# Patient Record
Sex: MALE | Race: WHITE
[De-identification: names, ages, dates, MRNs, and addresses within clinical notes are randomized per-mention and may not be internally consistent; named-entity substitution may affect disease eponyms.]

---

## 2020-07-07 ENCOUNTER — HOSPITAL ENCOUNTER (INPATIENT)
Dept: HOSPITAL 84 - D.ER | Age: 60
LOS: 2 days | Discharge: HOME | DRG: 511 | End: 2020-07-09
Attending: FAMILY MEDICINE | Admitting: FAMILY MEDICINE
Payer: COMMERCIAL

## 2020-07-07 VITALS
WEIGHT: 140.29 LBS | BODY MASS INDEX: 23.95 KG/M2 | BODY MASS INDEX: 23.95 KG/M2 | WEIGHT: 140.29 LBS | HEIGHT: 64 IN | HEIGHT: 64 IN

## 2020-07-07 VITALS — SYSTOLIC BLOOD PRESSURE: 139 MMHG | DIASTOLIC BLOOD PRESSURE: 86 MMHG

## 2020-07-07 VITALS — SYSTOLIC BLOOD PRESSURE: 123 MMHG | DIASTOLIC BLOOD PRESSURE: 70 MMHG

## 2020-07-07 VITALS — SYSTOLIC BLOOD PRESSURE: 125 MMHG | DIASTOLIC BLOOD PRESSURE: 80 MMHG

## 2020-07-07 DIAGNOSIS — S52.501A: Primary | ICD-10-CM

## 2020-07-07 DIAGNOSIS — S63.257A: ICD-10-CM

## 2020-07-07 DIAGNOSIS — S32.512A: ICD-10-CM

## 2020-07-07 DIAGNOSIS — S00.03XA: ICD-10-CM
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
## 2020-07-07 LAB
ALBUMIN SERPL-MCNC: 3.7 G/DL (ref 3.4–5)
ALP SERPL-CCNC: 70 U/L (ref 30–120)
ALT SERPL-CCNC: 56 U/L (ref 10–68)
ANION GAP SERPL CALC-SCNC: 11.8 MMOL/L (ref 8–16)
APTT BLD: 28.1 SECONDS (ref 22.8–39.4)
BASOPHILS NFR BLD AUTO: 1.2 % (ref 0–2)
BILIRUB SERPL-MCNC: 0.44 MG/DL (ref 0.2–1.3)
BILIRUB SERPL-MCNC: NEGATIVE MG/DL
BUN SERPL-MCNC: 31 MG/DL (ref 7–18)
CALCIUM SERPL-MCNC: 8.9 MG/DL (ref 8.5–10.1)
CHLORIDE SERPL-SCNC: 104 MMOL/L (ref 98–107)
CO2 SERPL-SCNC: 26 MMOL/L (ref 21–32)
CREAT SERPL-MCNC: 1.2 MG/DL (ref 0.6–1.3)
EOSINOPHIL NFR BLD: 1.3 % (ref 0–7)
ERYTHROCYTE [DISTWIDTH] IN BLOOD BY AUTOMATED COUNT: 12.9 % (ref 11.5–14.5)
GLOBULIN SER-MCNC: 3.3 G/L
GLUCOSE SERPL-MCNC: 100 MG/DL (ref 74–106)
GLUCOSE SERPL-MCNC: NEGATIVE MG/DL
HCT VFR BLD CALC: 41.9 % (ref 42–54)
HGB BLD-MCNC: 14 G/DL (ref 13.5–17.5)
IMM GRANULOCYTES NFR BLD: 0.9 % (ref 0–5)
INR PPP: 0.97 (ref 0.85–1.17)
KETONES UR STRIP-MCNC: NEGATIVE MG/DL
LYMPHOCYTES NFR BLD AUTO: 12.1 % (ref 15–50)
MCH RBC QN AUTO: 31.7 PG (ref 26–34)
MCHC RBC AUTO-ENTMCNC: 33.4 G/DL (ref 31–37)
MCV RBC: 94.8 FL (ref 80–100)
MONOCYTES NFR BLD: 6.7 % (ref 2–11)
NEUTROPHILS NFR BLD AUTO: 77.8 % (ref 40–80)
NITRITE UR-MCNC: NEGATIVE MG/ML
OSMOLALITY SERPL CALC.SUM OF ELEC: 282 MOSM/KG (ref 275–300)
PH UR STRIP: 5 [PH] (ref 5–6)
PLATELET # BLD: 216 10X3/UL (ref 130–400)
PMV BLD AUTO: 9.6 FL (ref 7.4–10.4)
POTASSIUM SERPL-SCNC: 3.8 MMOL/L (ref 3.5–5.1)
PROT SERPL-MCNC: 7 G/DL (ref 6.4–8.2)
PROTHROMBIN TIME: 12.8 SECONDS (ref 11.6–15)
RBC # BLD AUTO: 4.42 10X6/UL (ref 4.2–6.1)
RBC #/AREA URNS HPF: (no result) /HPF (ref 0–5)
SODIUM SERPL-SCNC: 138 MMOL/L (ref 136–145)
SP GR UR STRIP: 1.02 (ref 1–1.02)
UROBILINOGEN UR-MCNC: NORMAL MG/DL
WBC # BLD AUTO: 10 10X3/UL (ref 4.8–10.8)
WBC #/AREA URNS HPF: (no result) /HPF

## 2020-07-07 PROCEDURE — 0RSXXZZ REPOSITION LEFT FINGER PHALANGEAL JOINT, EXTERNAL APPROACH: ICD-10-PCS | Performed by: FAMILY MEDICINE

## 2020-07-07 PROCEDURE — 0PSH04Z REPOSITION RIGHT RADIUS WITH INTERNAL FIXATION DEVICE, OPEN APPROACH: ICD-10-PCS | Performed by: ORTHOPAEDIC SURGERY

## 2020-07-07 NOTE — NUR
RECIEVED TO ROOM ALERT RIGHT ARM IN SPLINT, LACERATION NOTED TO LEFT CHEEK, C
COLLAR IN PLACE, HAS LACERATIONS NOTED TO LEFT FOREARM AND RED ESCORATED AREAS
NOTED TO LEFT HIP AND SIDE, BRUSING ALSO NOTED TO FORHEAD, WIFE AT BEDSIDE
BOTH SPEAK VERY LITTLE ENGLISH, HS NOTIFIED FOR  PHONE, ABLE TO
ORIENTIATE TO ROOM AND USE CALL LIGHT

## 2020-07-07 NOTE — NUR
PHONE CONNECTED AND DISCUSSED PLAN OF CARE AND PLAN FOR SURGERY IN
AM CONCENTS REVIEWED AND SIGNED AFTER BOTH PT AND WIFE VERBALIZED
UNDERSTANDING, QUESTIONS ANSWERED VIA

## 2020-07-08 VITALS — SYSTOLIC BLOOD PRESSURE: 126 MMHG | DIASTOLIC BLOOD PRESSURE: 74 MMHG

## 2020-07-08 VITALS — SYSTOLIC BLOOD PRESSURE: 95 MMHG | DIASTOLIC BLOOD PRESSURE: 56 MMHG

## 2020-07-08 VITALS — SYSTOLIC BLOOD PRESSURE: 94 MMHG | DIASTOLIC BLOOD PRESSURE: 55 MMHG

## 2020-07-08 VITALS — SYSTOLIC BLOOD PRESSURE: 123 MMHG | DIASTOLIC BLOOD PRESSURE: 70 MMHG

## 2020-07-08 VITALS — DIASTOLIC BLOOD PRESSURE: 65 MMHG | SYSTOLIC BLOOD PRESSURE: 118 MMHG

## 2020-07-08 VITALS — DIASTOLIC BLOOD PRESSURE: 61 MMHG | SYSTOLIC BLOOD PRESSURE: 101 MMHG

## 2020-07-08 VITALS — DIASTOLIC BLOOD PRESSURE: 49 MMHG | SYSTOLIC BLOOD PRESSURE: 98 MMHG

## 2020-07-08 VITALS — DIASTOLIC BLOOD PRESSURE: 65 MMHG | SYSTOLIC BLOOD PRESSURE: 116 MMHG

## 2020-07-08 VITALS — SYSTOLIC BLOOD PRESSURE: 106 MMHG | DIASTOLIC BLOOD PRESSURE: 64 MMHG

## 2020-07-08 LAB
ALBUMIN SERPL-MCNC: 3.3 G/DL (ref 3.4–5)
ALP SERPL-CCNC: 60 U/L (ref 30–120)
ALT SERPL-CCNC: 46 U/L (ref 10–68)
ANION GAP SERPL CALC-SCNC: 12.8 MMOL/L (ref 8–16)
BASOPHILS NFR BLD AUTO: 0.5 % (ref 0–2)
BILIRUB SERPL-MCNC: 0.8 MG/DL (ref 0.2–1.3)
BUN SERPL-MCNC: 25 MG/DL (ref 7–18)
CALCIUM SERPL-MCNC: 8.2 MG/DL (ref 8.5–10.1)
CHLORIDE SERPL-SCNC: 108 MMOL/L (ref 98–107)
CK MB SERPL-MCNC: 11.4 U/L (ref 0–3.6)
CK MB SERPL-MCNC: 14 U/L (ref 0–3.6)
CK SERPL-CCNC: 620 UL (ref 21–232)
CK SERPL-CCNC: 663 UL (ref 21–232)
CO2 SERPL-SCNC: 23.5 MMOL/L (ref 21–32)
CREAT SERPL-MCNC: 0.9 MG/DL (ref 0.6–1.3)
EOSINOPHIL NFR BLD: 0.1 % (ref 0–7)
ERYTHROCYTE [DISTWIDTH] IN BLOOD BY AUTOMATED COUNT: 13.1 % (ref 11.5–14.5)
GLOBULIN SER-MCNC: 2.7 G/L
GLUCOSE SERPL-MCNC: 105 MG/DL (ref 74–106)
HCT VFR BLD CALC: 39.6 % (ref 42–54)
HGB BLD-MCNC: 13.1 G/DL (ref 13.5–17.5)
IMM GRANULOCYTES NFR BLD: 0.3 % (ref 0–5)
LYMPHOCYTES NFR BLD AUTO: 18.6 % (ref 15–50)
MAGNESIUM SERPL-MCNC: 2 MG/DL (ref 1.8–2.4)
MAGNESIUM SERPL-MCNC: 2 MG/DL (ref 1.8–2.4)
MCH RBC QN AUTO: 31.3 PG (ref 26–34)
MCHC RBC AUTO-ENTMCNC: 33.1 G/DL (ref 31–37)
MCV RBC: 94.7 FL (ref 80–100)
MONOCYTES NFR BLD: 7.4 % (ref 2–11)
NEUTROPHILS NFR BLD AUTO: 73.1 % (ref 40–80)
OSMOLALITY SERPL CALC.SUM OF ELEC: 282 MOSM/KG (ref 275–300)
PLATELET # BLD: 217 10X3/UL (ref 130–400)
PMV BLD AUTO: 10.1 FL (ref 7.4–10.4)
POTASSIUM SERPL-SCNC: 4.3 MMOL/L (ref 3.5–5.1)
PROT SERPL-MCNC: 6 G/DL (ref 6.4–8.2)
RBC # BLD AUTO: 4.18 10X6/UL (ref 4.2–6.1)
SODIUM SERPL-SCNC: 140 MMOL/L (ref 136–145)
TROPONIN I SERPL-MCNC: 0.02 NG/ML (ref 0–0.06)
TROPONIN I SERPL-MCNC: 0.03 NG/ML (ref 0–0.06)
WBC # BLD AUTO: 10 10X3/UL (ref 4.8–10.8)

## 2020-07-08 NOTE — NUR
PATIENT IN BED WITH IV INTACT. NO COMPLAINTS OR SIGNS OF DISTRESS. FAMILY AT
BEDSIDE. VS STABLE. CALL LIGHT WITHIN REACH.

## 2020-07-08 NOTE — NUR
ALERT RESTING IN BED DENIES PAIN OR NEEDS AT THSI TIME ACE WRAP DRESSING
INTACT TO RIGHT ARM, SEE SHIFT ASSESSMENT, CALL LIGHT IN REACH

## 2020-07-08 NOTE — NUR
PATIENT IN BED WITH IV INTACT. VS MONITOR UNPLUGGED. UNABLE TO RECORD POST OP
VS. VS HAVE BEEN STABLE THROUGH OUT DAY. PATIENT STILL HAS NO COMPLAINTS OF
PAIN. IV INTACT. CALL LIGHT WITHIN REACH.

## 2020-07-08 NOTE — NUR
PATIENT BACK TO ROOM. VS STABLE IV INTACT. STATES NO PAIN AT THIS TIME. FAMILY
AT BEDSIDE. ELEVATED ARM ON PILLOW AND PLACED ICE PACK. BSCDS ON AND WORKING.
CALL LIGHT WITHIN REACH.

## 2020-07-09 VITALS — DIASTOLIC BLOOD PRESSURE: 75 MMHG | SYSTOLIC BLOOD PRESSURE: 115 MMHG

## 2020-07-09 VITALS — SYSTOLIC BLOOD PRESSURE: 110 MMHG | DIASTOLIC BLOOD PRESSURE: 70 MMHG

## 2020-07-09 VITALS — SYSTOLIC BLOOD PRESSURE: 110 MMHG | DIASTOLIC BLOOD PRESSURE: 64 MMHG

## 2020-07-09 LAB
ALBUMIN SERPL-MCNC: 3.1 G/DL (ref 3.4–5)
ALP SERPL-CCNC: 55 U/L (ref 30–120)
ALT SERPL-CCNC: 38 U/L (ref 10–68)
ANION GAP SERPL CALC-SCNC: 9.5 MMOL/L (ref 8–16)
BASOPHILS NFR BLD AUTO: 0.2 % (ref 0–2)
BILIRUB SERPL-MCNC: 1.05 MG/DL (ref 0.2–1.3)
BUN SERPL-MCNC: 21 MG/DL (ref 7–18)
CALCIUM SERPL-MCNC: 8.1 MG/DL (ref 8.5–10.1)
CHLORIDE SERPL-SCNC: 106 MMOL/L (ref 98–107)
CO2 SERPL-SCNC: 25.5 MMOL/L (ref 21–32)
CREAT SERPL-MCNC: 0.9 MG/DL (ref 0.6–1.3)
EOSINOPHIL NFR BLD: 0.1 % (ref 0–7)
ERYTHROCYTE [DISTWIDTH] IN BLOOD BY AUTOMATED COUNT: 13.1 % (ref 11.5–14.5)
GLOBULIN SER-MCNC: 2.9 G/L
GLUCOSE SERPL-MCNC: 110 MG/DL (ref 74–106)
HCT VFR BLD CALC: 36.9 % (ref 42–54)
HGB BLD-MCNC: 12.3 G/DL (ref 13.5–17.5)
IMM GRANULOCYTES NFR BLD: 0.1 % (ref 0–5)
LYMPHOCYTES NFR BLD AUTO: 13.5 % (ref 15–50)
MAGNESIUM SERPL-MCNC: 2.2 MG/DL (ref 1.8–2.4)
MCH RBC QN AUTO: 31.5 PG (ref 26–34)
MCHC RBC AUTO-ENTMCNC: 33.3 G/DL (ref 31–37)
MCV RBC: 94.6 FL (ref 80–100)
MONOCYTES NFR BLD: 10.8 % (ref 2–11)
NEUTROPHILS NFR BLD AUTO: 75.3 % (ref 40–80)
OSMOLALITY SERPL CALC.SUM OF ELEC: 277 MOSM/KG (ref 275–300)
PLATELET # BLD: 208 10X3/UL (ref 130–400)
PMV BLD AUTO: 9.9 FL (ref 7.4–10.4)
POTASSIUM SERPL-SCNC: 4 MMOL/L (ref 3.5–5.1)
PROT SERPL-MCNC: 6 G/DL (ref 6.4–8.2)
RBC # BLD AUTO: 3.9 10X6/UL (ref 4.2–6.1)
SODIUM SERPL-SCNC: 137 MMOL/L (ref 136–145)
WBC # BLD AUTO: 10.8 10X3/UL (ref 4.8–10.8)

## 2020-07-09 NOTE — MORECARE
CASE MANAGEMENT DISCHARGE SUMMARY
 
 
PATIENT: RODRIGUEZ,REYES                     UNIT: [id number]
ACCOUNT#: [id number]                       ADM DATE: 20
AGE: 60     : 64  SEX: M            ROOM/BED: D.1212    
AUTHOR: CARONDOC                             PHYSICIAN:                               
 
REFERRING PHYSICIAN: ANN-MARIE WONG MD           
DATE OF SERVICE: 20
Discharge Plan
 
 
Patient Name: RODRIGUEZ, REYES
Facility: [facility]:[place]
Encounter #: [id number]
Medical Record #: [id number]
: 1960
Planned Disposition: Home or Self Care
Anticipated Discharge Date: 
 
Discharge Date: 
Expected LOS: 
Initial Reviewer: [email or web address]
Initial Review Date: 2020
Generated: 20   8:43 am 
Comments
 
DCP- Discharge Planning
 
Updated by [id number]: Sendy Grimaldo on 20   6:42 am CT
Patient Name: REYES RODRIGUEZ                                     
Admission Status: ER   
Accout number: [id number]                              
Admission Date: 2020   
: 1960                                                        
Admission Diagnosis:   
Attending: JAIME                                                
Current LOS:  2   
  
Anticipated DC Date:    
Planned Disposition: Home or Self Care   
Primary Insurance: [employer] MANAGED MEDICA[employer]   
  
  
Discharge Planning Comments:   
CM met with patient to complete initial dc planning assessment.  CM educated 
patient on the CM role and verbal consent given by patient to complete 
assessment.   Patient lives at home with his wife where he is independent 
with his care.  At discharge patient plans to return home and feels this is a 
 
safe discharge.  CM discussed availability of home health, rehab services, 
and medical equipment. The wife did not think he needed anything and would 
like to go home today. Patient denied known discharge needs at this time. CM 
will continue to follow and will assist as needed with dc plans/needs.    
  
  
  
  
  
: Sendy Grimaldo
 DCPIA - Discharge Planning Initial Assessment
 
Updated by [id number]: Sendy Grimaldo on 20   7:34 am
*  Is the patient Alert and Oriented?
Yes
*  How many steps to enter\exit or inside your home?
 
*  PCP
none
*  Pharmacy
[facility]
*  Preadmission Environment
Home with Family
*  ADLs
Independent
*  Equipment
None
*  List name and contact numbers for known caregivers / representatives who 
currently or will assist patient after discharge:
ELISSA (WIFE) [phone]
*  Verbal permission to speak to the caregivers and representatives has been 
obtained from the patient.
N/A
*  Community resources currently utilized
None
*  Additional services required to return to the preadmission environment?
No
*  Can the patient safely return to the preadmission environment?
Yes
*  Has this patient been hospitalized within the prior 30 days at any 
hospital?
No
 
 
 
 
 
 
 
Last DP export: 20   6:37 am
Patient Name: RODRIGUEZ, REYES
 
Encounter #: [id number]
Page [phone]
 
 
 
 
 
Electronically Signed by BRETT REARDON on 20 at 0744
 
 
 
 
 
 
**All edits/amendments must be made on the electronic document**
 
DICTATION DATE: 20     : YARY  2044     
RPT#: [id number]                                DC DATE:        
                                               STATUS: ADM IN  
[facility]
 [place] [address]
***END OF REPORT***

## 2020-07-09 NOTE — NUR
SPOKE WITH CHARLENE ABOUT PATIENT WANTING TO BE DC'D. DOESNT WANT TO USE
PLATFORM WALKER. STATED HE HAS NO PAIN WHEN GETTING UP WITH PT. CHARLENE
STATED SHE WOULD PUT IN THE ORDERS.

## 2020-07-09 NOTE — NUR
PATIENT IN BED WITH IV INTACT. NO COMPLAINTS OR SIGNS OF DISTRESS. FAMILY AT
BEDSIDE. CALL LIGHT WITHIN REACH. WRIST WITH DRESSING CDI, ELEVATED ON PILLOW.
NEUROVASCULAR CHECKS WNL.

## 2020-07-09 NOTE — OP
PATIENT NAME:  RODRIGUEZ,REYES                           MEDICAL RECORD: [id number]
:[birth year]                                             LOCATION:D.M3     D.1212
                                                         ADMISSION DATE:20
SURGEON:  BENOIT JARVIS MD        
 
 
DATE OF OPERATION:  2020
 
PREOPERATIVE DIAGNOSIS:  Displaced right distal radius fracture.
 
POSTOPERATIVE DIAGNOSIS:  Displaced right distal radius fracture.
 
PROCEDURE:  Open reduction and internal fixation of displaced right distal
radius fracture.
 
SURGEON:  Benoit Jarvis MD
 
ANESTHESIA:  General with a preoperative interscalene block.
 
INTRAOPERATIVE COMPLICATIONS:  None.
 
SUMMARY OF PATHOLOGIC FINDINGS:  The patient had a very displaced fracture that
reduced nicely with closed reduction and then was plated as it was unstable
pattern.
 
IMPLANTS USED:  Radom VariAx 2 system with a combination of both compression
and locking screws.
 
OPERATIVE SUMMARY IN DETAIL:  After obtaining the appropriate preoperative
orthopedic surgery consent as well as anesthetic consultation, evaluation, and
clearance, the patient was brought to the operating room and placed on the
operating table in the supine position.  After adequate general laryngeal mask
airway was administered, tourniquet was placed about the proximal aspect of the
right upper extremity.  The right upper extremity was then prepped and draped in
a routine sterile fashion.  The arm was elevated, exsanguinated, and the
tourniquet was inflated to 250 mmHg.  A routine volar approach of Lv's was
utilized, taken down to the level of the FCR, which was utilized as landmark. 
The dissection was then carried down to the fracture itself, gently clearing the
muscle belly.  Fracture was identified and it was reduced as seen under
fluoroscopy on both AP and lateral planes.  Plate was applied under fluoroscopic
guidance.  A combination of both locking and nonlocking screws was utilized for
fixation of the fracture.  After the fracture had been fixed in anatomical
plane, both AP and lateral views were taken and submitted to radiologist for
review.  The wound was then irrigated and closed with 2-0 Vicryl followed by 4-0
Prolene in a running fashion.  Sterile dressings were applied.  Tourniquet was
deflated.  The patient was awakened and taken to recovery room in stable
condition.  All final needle and sponge counts were correct.
 
NTS:[id number] Voice Confirmation ID: [id number] DOCUMENT ID: [id number]
 
 
 
OPERATIVE REPORT                               [id number]    RODRIGUEZ,REYES RUDDER MD, BENOIT WILLIS        
 
 
 
Electronically Signed by BENOIT JARVIS MD on 20 at 1039
 
 
 
 
 
 
 
 
 
 
 
 
 
 
 
 
 
 
 
 
 
 
 
 
 
 
 
 
 
 
 
 
 
 
 
 
 
 
 
 
 
CC:                                                             [id number]
DICTATION DATE: 20 0955     :     20 1849      ADM IN  
                                                                              
[facility]                                          
[address] [id number]

## 2020-07-09 NOTE — NUR
PATIENT IN BED WITH IV NTACT. NO COMPLAINTS OR SIGNS OF DISTRESS. FAMILY AT
BEDSIDE. CALL LIGHT WITHIN REACH.

## 2020-07-09 NOTE — MORECARE
CASE MANAGEMENT DISCHARGE SUMMARY
 
 
PATIENT: RODRIGUEZ,REYES                     UNIT: [id number]
ACCOUNT#: [id number]                       ADM DATE: 20
AGE: 60     : 64  SEX: M            ROOM/BED: D.1212    
AUTHOR: BRETT REARDON                             PHYSICIAN:                               
 
REFERRING PHYSICIAN: ANN-MARIE WONG MD           
DATE OF SERVICE: 20
Discharge Plan
 
 
Patient Name: RODRIGUEZ, REYES
[facility]
Encounter #: [id number]
Medical Record #: [id number]
: 1960
Planned Disposition: Home or Self Care
Anticipated Discharge Date: 
 
Discharge Date: 
Expected LOS: 
Initial Reviewer: [id number]
Initial Review Date: 2020
Generated: 20   8:36 am 
 DCPIA - Discharge Planning Initial Assessment
 
Updated by [id number]: Sendy Grimaldo on 20   7:34 am
*  Is the patient Alert and Oriented?
Yes
*  How many steps to enter\exit or inside your home?
 
*  PCP
none
*  [facility]
*  Preadmission Environment
Home with Family
*  ADLs
Independent
*  Equipment
None
*  List name and contact numbers for known caregivers / representatives who 
currently or will assist patient after discharge:
ELISSA (WIFE) [phone]
*  Verbal permission to speak to the caregivers and representatives has been 
obtained from the patient.
N/A
*  Community resources currently utilized
 
None
*  Additional services required to return to the preadmission environment?
No
*  Can the patient safely return to the preadmission environment?
Yes
*  Has this patient been hospitalized within the prior 30 days at any 
hospital?
No
 
 
 
 
 
 
Patient Name: RODRIGUEZ, REYES
Encounter #: [id number]
Page [id number]
 
 
 
 
 
Electronically Signed by BRETT REARDON on 20 at 0737
 
 
 
 
 
 
**All edits/amendments must be made on the electronic document**
 
DICTATION DATE: 20     : YARY  20     
RPT#: [id number]                                DC DATE:        
                                               STATUS: ADM IN  
[facility]
 [place] [address]
***END OF REPORT***

## 2020-07-09 NOTE — NUR
PATIENT AND WIFE RECIEVED DC INSTRUCTIONS. VERBALIZED UNDERSTANDING. WIFE
STATED SHE DIDNT WANT TO USE  PHONE AT THIS TIME. STATED SHE
UNDERSTOOD WHAT I TOLD HER AND WOULD HAVE HER 18 YEAR OLD READ HER ALL THE
PAPER WORK. PATIENT WILL NOT USE PLATFORM WALKER. WALKS WITH NO PROBLEMS.
EXPLAINED TO WIFE THAT PATIENT CAN PUT WEIGHT AS TOLERATED ON HIS LEG BUT NOT
HIS ARM. HE HAS TO WEAR THE SLING. VERBALIZED UNDERSTANDING. EXPLAINED TO PICK
UP MEDS AT [facility] AS REQUESTED. IV REMOVED WITH CATH TIP INTACT. WAITING
FOR WC FOR DC. CALLL IGHT WITHIN REACH.

## 2020-07-09 NOTE — NUR
PATIENT IN BED WITH IV INTACT. NO COMPLAINTS. DENIES PAIN. VS STABLE. FAMILY
AT BEDSIDE. CALL LIGHT WITHIN REACH. BSCDS ON AND WORKING.

## 2020-07-10 NOTE — MORECARE
CASE MANAGEMENT DISCHARGE SUMMARY
 
 
PATIENT: RODRIGUEZ,REYES                     UNIT: [id number]
ACCOUNT#: [id number]                       ADM DATE: 20
AGE: 60     : 64  SEX: M            ROOM/BED: D.1212    
AUTHOR: CARONDOC                             PHYSICIAN:                               
 
REFERRING PHYSICIAN: ANN-MARIE WONG MD           
DATE OF SERVICE: 07/10/20
Discharge Plan
 
 
Patient Name: RODRIGUEZ, REYES
Facility: [facility]:[place]
Encounter #: [id number]
Medical Record #: [id number]
: 1960
Planned Disposition: Home or Self Care
Anticipated Discharge Date: 
 
Discharge Date: 2020
Expected LOS: 
Initial Reviewer: [id number]
Initial Review Date: 2020
Generated: 7/10/20   2:13 pm 
DCP- Discharge Planning
 
Updated by [email or web address]: Sendy Grimaldo on 20   6:42 am CT
Patient Name: REYES RODRIGUEZ                                     
Admission Status: ER   
Accout number: [id number]                              
Admission Date: 2020   
: 1960                                                        
Admission Diagnosis:   
Attending: JAIME                                                
Current LOS:  2   
  
Anticipated DC Date:    
Planned Disposition: Home or Self Care   
Primary Insurance: [employer] MANAGED MEDICAID   
  
  
Discharge Planning Comments:   
CM met with patient to complete initial dc planning assessment.  CM educated 
patient on the CM role and verbal consent given by patient to complete 
assessment.   Patient lives at home with his wife where he is independent 
with his care.  At discharge patient plans to return home and feels this is a 
safe discharge.  CM discussed availability of home health, rehab services, 
and medical equipment. The wife did not think he needed anything and would 
 
like to go home today. Patient denied known discharge needs at this time. CM 
will continue to follow and will assist as needed with dc plans/needs.    
  
  
  
  
  
: Sendy Grimaldo
 DCPIA - Discharge Planning Initial Assessment
 
Updated by [email or web address]: Sendy Grimaldo on 20   7:34 am
*  Is the patient Alert and Oriented?
Yes
*  How many steps to enter\exit or inside your home?
 
*  PCP
none
*  Pharmacy
[facility]
*  Preadmission Environment
Home with Family
*  ADLs
Independent
*  Equipment
None
*  List name and contact numbers for known caregivers / representatives who 
currently or will assist patient after discharge:
ELISSA (WIFE) [phone]
*  Verbal permission to speak to the caregivers and representatives has been 
obtained from the patient.
N/A
*  Community resources currently utilized
None
*  Additional services required to return to the preadmission environment?
No
*  Can the patient safely return to the preadmission environment?
Yes
*  Has this patient been hospitalized within the prior 30 days at any 
hospital?
No
 
 
 
 
 
 
 
Last DP export: 20   6:44 am
Patient Name: RODRIGUEZ, REYES
 
Encounter #: [id number]
Page [id number]
 
 
 
 
 
Electronically Signed by BRETT REARDON on 07/10/20 at 1313
 
 
 
 
 
 
**All edits/amendments must be made on the electronic document**
 
DICTATION DATE: 07/10/20 1313     : YARY  07/10/20 1313     
RPT#: [id number]                                DC DATE:20
                                               STATUS: DIS IN  
[facility]
 [place] [address]
***END OF REPORT***

## 2021-03-19 ENCOUNTER — HOSPITAL ENCOUNTER (EMERGENCY)
Dept: HOSPITAL 84 - D.ER | Age: 61
Discharge: HOME | End: 2021-03-19
Payer: COMMERCIAL

## 2021-03-19 VITALS — BODY MASS INDEX: 23.09 KG/M2 | WEIGHT: 135.28 LBS | HEIGHT: 64 IN

## 2021-03-19 VITALS — SYSTOLIC BLOOD PRESSURE: 112 MMHG | DIASTOLIC BLOOD PRESSURE: 70 MMHG

## 2021-03-19 DIAGNOSIS — S92.351A: Primary | ICD-10-CM
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
## 2021-03-25 ENCOUNTER — HOSPITAL ENCOUNTER (OUTPATIENT)
Dept: HOSPITAL 84 - D.OPS | Age: 61
Discharge: HOME | End: 2021-03-25
Attending: ORTHOPAEDIC SURGERY
Payer: COMMERCIAL

## 2021-03-25 VITALS — WEIGHT: 140 LBS | BODY MASS INDEX: 21.97 KG/M2 | BODY MASS INDEX: 21.97 KG/M2 | HEIGHT: 67 IN

## 2021-03-25 VITALS — SYSTOLIC BLOOD PRESSURE: 119 MMHG | DIASTOLIC BLOOD PRESSURE: 75 MMHG

## 2021-03-25 DIAGNOSIS — S92.351A: Primary | ICD-10-CM
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
## 2021-03-25 NOTE — NUR
DISCHARGED VIA W/C, ACCOMPANIED BY THIS NURSE, TO POV WITH SPOUSE
DRIVING. ALL BELONGINGS WITH PT/SPOUSE.

## 2021-03-25 NOTE — NUR
IV DC'D WITH CATH TIP INTACT. PT ASSISTED TO GET DRESSED. DRESSING TO
RLE REMAINS CDI WITH SPLINT IN PLACE. BRISK CAP REFILL TO RIGHT TOES;
TOES PINK AND WARM TO TOUCH. PT ABLE TO WIGGLE DIGITS RIGHT FOOT.
THEN CALLED  LINE [phone] AND SPOKE WITH
 # [phone] FOR [place] INSTRUCTIONS FOR PT. ALL
INSTRUCTIONS PROVIDED THROUGH  WITH TIME FOR QUESTIONS
FROM PT ALLOWED. PT STATES NO QUESTIONS AND VOICED UNDERSTANDING OF
INSTRUCTIONS GIVEN VIA . COPY OF ALL INSTRUCTIONS AND
ORIGINAL RX'S OF KEFLEX AND NORCO GIVEN TO PT'S SPOUSE.

## 2021-03-27 NOTE — OP
PATIENT NAME:  RODRIGUEZ,REYES                           MEDICAL RECORD: [id number]
:[birth year]                                             LOCATION:D.OPS          
                                                         ADMISSION DATE:        
SURGEON:  GUERLINE ZACARIAS MD           
 
 
DATE OF OPERATION:  2021
 
PREOPERATIVE DIAGNOSIS:  Right fifth metatarsal fracture.
 
POSTOPERATIVE DIAGNOSIS:  Right fifth metatarsal fracture.
 
PROCEDURE PERFORMED:  ORIF right fifth metatarsal.
 
INDICATIONS FOR THE PROCEDURE:  Mr. Arrieta is a 61-year-old male who injured
his right foot approximately 1 week ago when he was cutting a limb.  The limb
fell and landed across the top of his foot.  He complained of immediate pain and
swelling.  He was seen in the Emergency Department where x-rays showed fracture
along the distal shaft of the fifth metatarsal.  He was seen by orthopedics,
noted to have fracture of the fifth metatarsal with significant displacement. 
Decision was made to proceed with surgery for operative intervention.  Risks,
benefits and alternatives of surgery were discussed with the patient and consent
was obtained.
 
DESCRIPTION OF THE PROCEDURE:  The patient was met in the holding area where his
identity and confirmation of procedure was performed.  The right lower extremity
was marked.  He was taken to the operating room where he was placed supine on
the operating table, and anesthesia was administered, tourniquet was applied to
the right thigh.  The right leg was prepped and draped in a sterile fashion. 
The patient received preoperative antibiotics and timeout was performed before
initiating the case.  On initiation of the case, leg was exsanguinated and the
tourniquet was raised.  Total tourniquet time was 40 minutes.  We began with
localization of the fracture under fluoroscopy and a small incision was then
made over the dorsal foot.  A K-wire was placed at the base of the fifth
metatarsal, we advanced the K-wire to the fracture site.  We attempted to
advance this across the fracture, but was unsuccessful.  We therefore made a
separate incision laterally and we were able to dissect down to the fracture
site.  We were able to perform a reduction and then hold the bones reduced as
the K-wire was advanced.  We had considered placing a screw, but due to the bend
in the wire that would not be possible.  The K-wire appeared to provide
adequate fixation and therefore completed our procedure.  Final images were
obtained that showed good alignment and fixation of the fifth metatarsal.  The
wound was irrigated thoroughly with saline.  The incision was closed with a 4-0
nylon suture.  There was significant bruising of the skin over the dorsal foot
in the area of the injury.  Some of this was disrupted with the manipulation as
part of the surgery.  The top layer of skin was debrided and the Xeroform
dressing was placed over this.  A Jurgan ball was placed over the pin and a
sterile dressing was applied.  The patient was then placed into a well-padded
splint.  He was turned back over to anesthesia where he was awakened, extubated,
and taken to recovery room in stable condition.
 
POSTOPERATIVE PLAN:  The patient is going to return home with his family today. 
He needs to remain nonweightbearing on the right leg.  We will plan to see him
back in clinic in approximately 10 days.
 
COMPLICATIONS:  None.
 
ANESTHESIA:  General.
 
 
 
OPERATIVE REPORT                               [id number]    RODRIGUEZ,REYES         
 
 
 
ESTIMATED BLOOD LOSS:  10 mL.
 
TRANSINT:[id number] Voice Confirmation ID: [id number] DOCUMENT ID: [id number]
                                           
                                           GUERLINE ZACARIAS MD           
 
 
 
Electronically Signed by GUERLINE ZACARIAS on 21 at 1155
 
 
 
 
 
 
 
 
 
 
 
 
 
 
 
 
 
 
 
 
 
 
 
 
 
 
 
 
 
 
 
 
 
 
 
 
 
CC:                                                             [id number]
DICTATION DATE: 21 1216     :     21 1234      [facility] 
                                                                      [address] [place] [address]